# Patient Record
Sex: MALE | URBAN - METROPOLITAN AREA
[De-identification: names, ages, dates, MRNs, and addresses within clinical notes are randomized per-mention and may not be internally consistent; named-entity substitution may affect disease eponyms.]

---

## 2017-04-04 ENCOUNTER — OFFICE VISIT - HEALTHEAST (OUTPATIENT)
Dept: FAMILY MEDICINE | Facility: CLINIC | Age: 37
End: 2017-04-04

## 2017-04-04 DIAGNOSIS — Z23 NEED FOR TDAP VACCINATION: ICD-10-CM

## 2017-04-04 DIAGNOSIS — S61.512A WRIST LACERATION, LEFT, INITIAL ENCOUNTER: ICD-10-CM

## 2021-05-30 VITALS — WEIGHT: 268.4 LBS

## 2021-06-09 NOTE — PROGRESS NOTES
Chief Complaint   Patient presents with     Wrist Injury     Left, sawing wood. Today. Needs Tdap      History of Present Illness:    Johnathan Garcia is a 36 y/o male who presents to the clinic who sustained a left wrist laceration about 1.5 hours ago. 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 Tetanus vaccination status reviewed: tetanus re-vaccination not indicated, Td vaccination indicated and given today.        PROCEDURE NOTE:  The risks and benefits of laceration repair were discussed with patient.The risk of vascular injury,  nerve injury, infection, tendon injury, treatment failure and scarring were discussed.  Johnathan Garcia desires to proceed and provides verbal consent.       L.E.T. Was  applied.  Local anesthesia with 1%  Lidocaine with Epinephrine. Wound cleansed, irrigated and debrided of visible foreign material and necrotic tissue.  There was no involvement of deeper structures.   A total of 2 sutures using  Prolene/Ethilon were placed in an interrupted fashion to close the wound.  There was excellent wound edge reapproximation and hemostasis.  Antibiotic ointment and a wound dressing were applied.    The patient  tolerated the procedure well.     Review of systems: See history of present illness, otherwise negative.   No current outpatient prescriptions on file.     No current facility-administered medications for this visit.       No past medical history on file.   No past surgical history on file.   Social History     Social History     Marital status:      Spouse name: N/A     Number of children: N/A     Years of education: N/A     Social History Main Topics     Smoking status: Former Smoker     Smokeless tobacco: Never Used     Alcohol use None     Drug use: None     Sexual activity: Not Asked     Other Topics Concern     None     Social History Narrative     None      History   Smoking Status     Former Smoker   Smokeless Tobacco     Never Used      Physical Exam  Blood  pressure 121/66, pulse 78, temperature 98.4  F (36.9  C), temperature source Oral, resp. rate 12, weight (!) 268 lb 6.4 oz (121.7 kg), SpO2 95 %.   General: Pleasant 38 y/o male in NAD.  HEENT: Atraumatic, normocephalic.  Respiratory: Lungs are clear to auscultation and percussion.  DERMATOLOGIC: There is 2 cm laceration in length noted on the left wrist with clean wound edges, no foreign bodies. Neurovascular and tendon structures are intact. Motor and sensory function is normal distal to the wound.      Assessment/Plan   1. Wrist laceration, left, initial encounter     2. Need for Tdap vaccination  Tdap vaccine,  8yo or older,  IM      38 y/o male presenting to [facility] in clinic after sustaining 2 cm wrist laceration. Tdap vaccine was administered and patient had sutures applied to his open wound after cleaning, the wound was not dirty or contaminated and had clean wound edges.Wound care instructions are provided.  The patient is asked to observe for any signs of infection or other problems and to otherwise      Patient Instructions   Discussed with the patient wound appears clean, he will be discharged with Bacitracin ointment to apply twice daily.  His sutures are absorbable.  He is instructed to seek medical care if there is soft tissue cellulitis, fever, drainage or discharge from incisional site.       Harris Wharton,   Internal Medicine